# Patient Record
Sex: MALE | Race: WHITE | NOT HISPANIC OR LATINO | Employment: STUDENT | URBAN - METROPOLITAN AREA
[De-identification: names, ages, dates, MRNs, and addresses within clinical notes are randomized per-mention and may not be internally consistent; named-entity substitution may affect disease eponyms.]

---

## 2017-08-17 ENCOUNTER — OFFICE VISIT (OUTPATIENT)
Dept: FAMILY MEDICINE CLINIC | Facility: CLINIC | Age: 27
End: 2017-08-17

## 2017-08-17 VITALS
TEMPERATURE: 97.8 F | HEART RATE: 70 BPM | DIASTOLIC BLOOD PRESSURE: 70 MMHG | WEIGHT: 211 LBS | SYSTOLIC BLOOD PRESSURE: 122 MMHG | BODY MASS INDEX: 27.96 KG/M2 | HEIGHT: 73 IN | OXYGEN SATURATION: 98 %

## 2017-08-17 DIAGNOSIS — Z23 NEED FOR VACCINATION AGAINST HEPATITIS B VIRUS: Primary | ICD-10-CM

## 2017-08-17 DIAGNOSIS — Z71.85 IMMUNIZATION COUNSELING: ICD-10-CM

## 2017-08-17 PROCEDURE — 90471 IMMUNIZATION ADMIN: CPT | Performed by: NURSE PRACTITIONER

## 2017-08-17 PROCEDURE — 90746 HEPB VACCINE 3 DOSE ADULT IM: CPT | Performed by: NURSE PRACTITIONER

## 2017-08-17 PROCEDURE — 99212 OFFICE O/P EST SF 10 MIN: CPT | Performed by: NURSE PRACTITIONER

## 2017-08-17 NOTE — PROGRESS NOTES
Subjective   Yaneth Harrell is a 26 y.o. male.   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 Denies fever, recent illness or previous adverse reaction to vaccine.    History of Present Illness  The patient has no complaints or health concerns today.  The following portions of the patient's history were reviewed and updated as appropriate: allergies, current medications, past family history, past medical history, past social history, past surgical history and problem list.    Review of Systems   Constitutional: Negative for activity change, appetite change, chills, diaphoresis, fatigue and fever.   HENT: Negative.    Respiratory: Negative.        Objective   Physical Exam   Constitutional: He is oriented to person, place, and time. Vital signs are normal. He appears well-developed and well-nourished. He is cooperative. No distress.   Cardiovascular: Normal rate, regular rhythm and normal heart sounds.    Pulmonary/Chest: Effort normal and breath sounds normal.   Neurological: He is alert and oriented to person, place, and time.   Skin: Skin is warm and dry. No rash noted. He is not diaphoretic.   Psychiatric: He has a normal mood and affect. His behavior is normal. Judgment and thought content normal.   Vitals reviewed.      Assessment/Plan   Yaneth was seen today for immunizations.    Diagnoses and all orders for this visit:    Need for vaccination against hepatitis B virus  -     Hepatitis B Vaccine Adult IM    Immunization counseling  -     Hepatitis B Vaccine Adult IM       Hepatitis B vaccination discussed with patient and questions answered. Patient advised that vaccination is a series of three injections at 0, 1 and 6 months. VIS information provided to patient as well as copy of immunization record.

## 2017-08-17 NOTE — PATIENT INSTRUCTIONS
Hepatitis B Vaccine: What You Need to Know  1. Why get vaccinated?  Hepatitis B is a serious disease that affects the liver. It is caused by the hepatitis B virus. Hepatitis B can cause mild illness lasting a few weeks, or it can lead to a serious, lifelong illness.  Hepatitis B virus infection can be either acute or chronic.  Acute hepatitis B virus infection is a short-term illness that occurs within the first 6 months after someone is exposed to the hepatitis B virus. This can lead to:  · fever, fatigue, loss of appetite, nausea, and/or vomiting  · jaundice (yellow skin or eyes, dark urine, jordin-colored bowel movements)  · pain in muscles, joints, and stomach  Chronic hepatitis B virus infection is a long-term illness that occurs when the hepatitis B virus remains in a person's body. Most people who go on to develop chronic hepatitis B do not have symptoms, but it is still very serious and can lead to:  · liver damage (cirrhosis)  · liver cancer  · death  Chronically-infected people can spread hepatitis B virus to others, even if they do not feel or look sick themselves. Up to 1.4 million people in the [place] may have chronic hepatitis B infection. About 90% of infants who get hepatitis B become chronically infected and about 1 out of 4 of them dies.  Hepatitis B is spread when blood, semen, or other body fluid infected with the Hepatitis B virus enters the body of a person who is not infected. People can become infected with the virus through:  · Birth (a baby whose mother is infected can be infected at or after birth)  · Sharing items such as razors or toothbrushes with an infected person  · Contact with the blood or open sores of an infected person  · Sex with an infected partner  · Sharing needles, syringes, or other drug-injection equipment  · Exposure to blood from needlesticks or other sharp instruments  Each year about 2,000 people in the [place] die from hepatitis B-related liver  disease.  Hepatitis B vaccine can prevent hepatitis B and its consequences, including liver cancer and cirrhosis.  2. Hepatitis B vaccine  Hepatitis B vaccine is made from parts of the hepatitis B virus. It cannot cause hepatitis B infection. The vaccine is usually given as 3 or 4 shots over a 6-month period.  Infants should get their first dose of hepatitis B vaccine at birth and will usually complete the series at 6 months of age.  All children and adolescents younger than 19 years of age who have not yet gotten the vaccine should also be vaccinated.  Hepatitis B vaccine is recommended for unvaccinated adults who are at risk for hepatitis B virus infection, including:  · People whose sex partners have hepatitis B  · Sexually active persons who are not in a long-term monogamous relationship  · Persons seeking evaluation or treatment for a sexually transmitted disease  · Men who have sexual contact with other men  · People who share needles, syringes, or other drug-injection equipment  · People who have household contact with someone infected with the hepatitis B virus  · Health care and public safety workers at risk for exposure to blood or body fluids  · Residents and staff of facilities for developmentally disabled persons  · Persons in correctional facilities  · Victims of sexual assault or abuse  · Travelers to regions with increased rates of hepatitis B  · People with chronic liver disease, kidney disease, HIV infection, or diabetes  · Anyone who wants to be protected from hepatitis B  There are no known risks to getting hepatitis B vaccine at the same time as other vaccines.  3. Some people should not get this vaccine  Tell the person who is giving the vaccine:  · If the person getting the vaccine has any severe, life-threatening allergies.  If you ever had a life-threatening allergic reaction after a dose of hepatitis B vaccine, or have a severe allergy to any part of this vaccine, you may be advised not to  get vaccinated. Ask your health care provider if you want information about vaccine components.  · If the person getting the vaccine is not feeling well.  If you have a mild illness, such as a cold, you can probably get the vaccine today. If you are moderately or severely ill, you should probably wait until you recover. Your doctor can advise you.  4. Risks of a vaccine reaction  With any medicine, including vaccines, there is a chance of side effects. These are usually mild and go away on their own, but serious reactions are also possible.  Most people who get hepatitis B vaccine do not have any problems with it.  Minor problems following hepatitis B vaccine include:  · soreness where the shot was given  · temperature of 99.9°F or higher  If these problems occur, they usually begin soon after the shot and last 1 or 2 days.  Your doctor can tell you more about these reactions.  Other problems that could happen after this vaccine:  · People sometimes faint after a medical procedure, including vaccination. Sitting or lying down for about 15 minutes can help prevent fainting and injuries caused by a fall. Tell your provider if you feel dizzy, or have vision changes or ringing in the ears.  · Some people get shoulder pain that can be more severe and longer-lasting than the more routine soreness that can follow injections. This happens very rarely.  · Any medication can cause a severe allergic reaction. Such reactions from a vaccine are very rare, estimated at about 1 in a million doses, and would happen within a few minutes to a few hours after the vaccination.  As with any medicine, there is a very remote chance of a vaccine causing a serious injury or death.  The safety of vaccines is always being monitored. For more information, visit: www.cdc.gov/vaccinesafety/  5. What if there is a serious problem?  What should I look for?  · Look for anything that concerns you, such as signs of a severe allergic reaction, very  high fever, or unusual behavior.  Signs of a severe allergic reaction can include hives, swelling of the face and throat, difficulty breathing, a fast heartbeat, dizziness, and weakness. These would start a few minutes to a few hours after the vaccination.  What should I do?  · If you think it is a severe allergic reaction or other emergency that can't wait, call 9-1-1 or get to the nearest hospital. Otherwise, call your clinic.  Afterward, the reaction should be reported to the Vaccine Adverse Event Reporting System (VAERS). Your doctor should file this report, or you can do it yourself through the VAERS web site at www.vaers.[facility].gov, or by calling 1-199.809.1410.  VAERS does not give medical advice.  6. The National Vaccine Injury Compensation Program  The National Vaccine Injury Compensation Program (VICP) is a federal program that was created to compensate people who may have been injured by certain vaccines.  Persons who believe they may have been injured by a vaccine can learn about the program and about filing a claim by calling 1-616.158.5574 or visiting the VICP website at www.hrsa.gov/vaccinecompensation. There is a time limit to file a claim for compensation.  7. How can I learn more?  · Ask your healthcare provider. He or she can give you the vaccine package insert or suggest other sources of information.  · Call your local or state health department.  · Contact the Centers for Disease Control and Prevention (CDC):    Call 1-848.570.3759 (0-144-DPY-INFO) or    Visit CDC's website at www.cdc.gov/vaccines  CDC Hepatitis B VIS (7/20/2016)     This information is not intended to replace advice given to you by your health care provider. Make sure you discuss any questions you have with your health care provider.     Document Released: 10/12/2007 Document Revised: 09/07/2016 Document Reviewed: 08/06/2016  Elsevier Interactive Patient Education ©2017 [employer]  Immunization Schedule, Adult  · Influenza  vaccine.    All adults should be immunized every year.    All adults, including pregnant women and people with hives-only allergy to eggs can receive the inactivated influenza (IIV) vaccine.    Adults aged 18-49 years can receive the recombinant influenza (JASMIN) vaccine. The JASMIN vaccine does not contain any egg protein.    Adults aged 65 years or older can receive the standard-dose IIV or the high-dose IIV.  · Tetanus, diphtheria, and acellular pertussis (Td, Tdap) vaccine.    Pregnant women should receive 1 dose of Tdap vaccine during each pregnancy. The dose should be obtained regardless of the length of time since the last dose. Immunization is preferred during the 27th to 36th week of gestation.    An adult who has not previously received Tdap or who does not know his or her vaccine status should receive 1 dose of Tdap. This initial dose should be followed by tetanus and diphtheria toxoids (Td) booster doses every 10 years.    Adults with an unknown or incomplete history of completing a 3-dose immunization series with Td-containing vaccines should begin or complete a primary immunization series including a Tdap dose.    Adults should receive a Td booster every 10 years.  · Varicella vaccine.    An adult without evidence of immunity to varicella should receive 2 doses or a second dose if he or she has previously received 1 dose.    Pregnant females who do not have evidence of immunity should receive the first dose after pregnancy. This first dose should be obtained before leaving the health care facility. The second dose should be obtained 4-8 weeks after the first dose.  · Human papillomavirus (HPV) vaccine.    Females aged 13-26 years who have not received the vaccine previously should obtain the 3-dose series.    The vaccine is not recommended for use in pregnant females. However, pregnancy testing is not needed before receiving a dose. If a female is found to be pregnant after receiving a dose, no treatment is  needed. In that case, the remaining doses should be delayed until after the pregnancy.    Males aged 13-21 years who have not received the vaccine previously should receive the 3-dose series. Males aged 22-26 years may be immunized.    Immunization is recommended through the age of 26 years for any male who has sex with males and did not get any or all doses earlier.    Immunization is recommended for any person with an immunocompromised condition through the age of 26 years if he or she did not get any or all doses earlier.    During the 3-dose series, the second dose should be obtained 4-8 weeks after the first dose. The third dose should be obtained 24 weeks after the first dose and 16 weeks after the second dose.  · Zoster vaccine.    One dose is recommended for adults aged 60 years or older unless certain conditions are present.  · Measles, mumps, and rubella (MMR) vaccine.    Adults born before 1957 generally are considered immune to measles and mumps.    Adults born in 1957 or later should have 1 or more doses of MMR vaccine unless there is a contraindication to the vaccine or there is laboratory evidence of immunity to each of the three diseases.    A routine second dose of MMR vaccine should be obtained at least 28 days after the first dose for students attending postsecondary schools, health care workers, or international travelers.    People who received inactivated measles vaccine or an unknown type of measles vaccine during [id number] should receive 2 doses of MMR vaccine.    People who received inactivated mumps vaccine or an unknown type of mumps vaccine before 1979 and are at high risk for mumps infection should consider immunization with 2 doses of MMR vaccine.    For females of childbearing age, rubella immunity should be determined. If there is no evidence of immunity, females who are not pregnant should be vaccinated. If there is no evidence of immunity, females who are pregnant should delay  immunization until after pregnancy.    Unvaccinated health care workers born before 1957 who lack laboratory evidence of measles, mumps, or rubella immunity or laboratory confirmation of disease should consider measles and mumps immunization with 2 doses of MMR vaccine or rubella immunization with 1 dose of MMR vaccine.  · Pneumococcal 13-valent conjugate (PCV13) vaccine.    When indicated, a person who is uncertain of his or her immunization history and has no record of immunization should receive the PCV13 vaccine.    An adult aged 19 years or older who has certain medical conditions and has not been previously immunized should receive 1 dose of PCV13 vaccine. This PCV13 should be followed with a dose of pneumococcal polysaccharide (PPSV23) vaccine. The PPSV23 vaccine dose should be obtained at least 8 weeks after the dose of PCV13 vaccine.    An adult aged 19 years or older who has certain medical conditions and previously received 1 or more doses of PPSV23 vaccine should receive 1 dose of PCV13. The PCV13 vaccine dose should be obtained 1 or more years after the last PPSV23 vaccine dose.  · Pneumococcal polysaccharide (PPSV23) vaccine.    When PCV13 is also indicated, PCV13 should be obtained first.    All adults aged 65 years and older should be immunized.    An adult younger than age 65 years who has certain medical conditions should be immunized.    Any person who resides in a nursing home or long-term care facility should be immunized.    An adult smoker should be immunized.    People with an immunocompromised condition and certain other conditions should receive both PCV13 and PPSV23 vaccines.    People with human immunodeficiency virus (HIV) infection should be immunized as soon as possible after diagnosis.    Immunization during chemotherapy or radiation therapy should be avoided.    Routine use of PPSV23 vaccine is not recommended for American Indians, [place] Natives, or people younger than 65 years  unless there are medical conditions that require PPSV23 vaccine.    When indicated, people who have unknown immunization and have no record of immunization should receive PPSV23 vaccine.    One-time revaccination 5 years after the first dose of PPSV23 is recommended for people aged 19-64 years who have chronic kidney failure, nephrotic syndrome, asplenia, or immunocompromised conditions.    People who received 1-2 doses of PPSV23 before age 65 years should receive another dose of PPSV23 vaccine at age 65 years or later if at least 5 years have passed since the previous dose.    Doses of PPSV23 are not needed for people immunized with PPSV23 at or after age 65 years.  · Meningococcal vaccine.    Adults with asplenia or persistent complement component deficiencies should receive 2 doses of quadrivalent meningococcal conjugate (MenACWY-D) vaccine. The doses should be obtained at least 2 months apart.    Microbiologists working with certain meningococcal bacteria,  recruits, people at risk during an outbreak, and people who travel to or live in countries with a high rate of meningitis should be immunized.    A first-year college student up through age 21 years who is living in a residence [place] should receive a dose if he or she did not receive a dose on or after his or her 16th birthday.    Adults who have certain high-risk conditions should receive one or more doses of vaccine.  · Hepatitis A vaccine.    Adults who wish to be protected from this disease, have certain high-risk conditions, work with hepatitis A-infected animals, work in hepatitis A research labs, or travel to or work in countries with a high rate of hepatitis A should be immunized.    Adults who were previously unvaccinated and who anticipate close contact with an international adoptee during the first 60 days after arrival in the [place] from a country with a high rate of hepatitis A should be immunized.  · Hepatitis B vaccine.    Adults  who wish to be protected from this disease, have certain high-risk conditions, may be exposed to blood or other infectious body fluids, are household contacts or sex partners of hepatitis B positive people, are clients or workers in certain care facilities, or travel to or work in countries with a high rate of hepatitis B should be immunized.  · Haemophilus influenzae type b (Hib) vaccine.    A previously unvaccinated person with asplenia or sickle cell disease or having a scheduled splenectomy should receive 1 dose of Hib vaccine.    Regardless of previous immunization, a recipient of a hematopoietic stem cell transplant should receive a 3-dose series 6-12 months after his or her successful transplant.    Hib vaccine is not recommended for adults with HIV infection.     This information is not intended to replace advice given to you by your health care provider. Make sure you discuss any questions you have with your health care provider.     Document Released: 03/09/2005 Document Revised: 04/14/2014 Document Reviewed: 02/04/2014  [employer] Interactive Patient Education ©2017 [employer] Inc.

## 2017-09-18 ENCOUNTER — CLINICAL SUPPORT (OUTPATIENT)
Dept: FAMILY MEDICINE CLINIC | Facility: CLINIC | Age: 27
End: 2017-09-18

## 2017-09-18 DIAGNOSIS — Z23 NEED FOR VACCINATION: Primary | ICD-10-CM

## 2017-09-18 PROCEDURE — 90471 IMMUNIZATION ADMIN: CPT | Performed by: NURSE PRACTITIONER

## 2017-09-18 PROCEDURE — 90746 HEPB VACCINE 3 DOSE ADULT IM: CPT | Performed by: NURSE PRACTITIONER

## 2017-12-05 ENCOUNTER — OFFICE VISIT (OUTPATIENT)
Dept: FAMILY MEDICINE CLINIC | Facility: CLINIC | Age: 27
End: 2017-12-05

## 2017-12-05 VITALS
HEART RATE: 72 BPM | HEIGHT: 73 IN | BODY MASS INDEX: 27.22 KG/M2 | SYSTOLIC BLOOD PRESSURE: 118 MMHG | WEIGHT: 205.4 LBS | DIASTOLIC BLOOD PRESSURE: 74 MMHG | OXYGEN SATURATION: 98 % | TEMPERATURE: 99 F

## 2017-12-05 DIAGNOSIS — Z00.00 HEALTHCARE MAINTENANCE: Primary | ICD-10-CM

## 2017-12-05 LAB
ALBUMIN SERPL-MCNC: 5 G/DL (ref 3.2–4.8)
ALBUMIN/GLOB SERPL: 1.4 G/DL (ref 1.5–2.5)
ALP SERPL-CCNC: 104 U/L (ref 25–100)
ALT SERPL W P-5'-P-CCNC: 54 U/L (ref 7–40)
ANION GAP SERPL CALCULATED.3IONS-SCNC: 11 MMOL/L (ref 3–11)
ARTICHOKE IGE QN: 181 MG/DL (ref 0–130)
AST SERPL-CCNC: 24 U/L (ref 0–33)
BASOPHILS # BLD AUTO: 0.02 10*3/MM3 (ref 0–0.2)
BASOPHILS NFR BLD AUTO: 0.4 % (ref 0–1)
BILIRUB SERPL-MCNC: 0.6 MG/DL (ref 0.3–1.2)
BUN BLD-MCNC: 18 MG/DL (ref 9–23)
BUN/CREAT SERPL: 16.4 (ref 7–25)
CALCIUM SPEC-SCNC: 10 MG/DL (ref 8.7–10.4)
CHLORIDE SERPL-SCNC: 99 MMOL/L (ref 99–109)
CHOLEST SERPL-MCNC: 224 MG/DL (ref 0–200)
CO2 SERPL-SCNC: 29 MMOL/L (ref 20–31)
CREAT BLD-MCNC: 1.1 MG/DL (ref 0.6–1.3)
CRP SERPL-MCNC: 0.06 MG/DL (ref 0–1)
DEPRECATED RDW RBC AUTO: 38.1 FL (ref 37–54)
EOSINOPHIL # BLD AUTO: 0.07 10*3/MM3 (ref 0–0.3)
EOSINOPHIL NFR BLD AUTO: 1.5 % (ref 0–3)
ERYTHROCYTE [DISTWIDTH] IN BLOOD BY AUTOMATED COUNT: 12.3 % (ref 11.3–14.5)
GFR SERPL CREATININE-BSD FRML MDRD: 82 ML/MIN/1.73
GLOBULIN UR ELPH-MCNC: 3.6 GM/DL
GLUCOSE BLD-MCNC: 77 MG/DL (ref 70–100)
HCT VFR BLD AUTO: 44.3 % (ref 38.9–50.9)
HDLC SERPL-MCNC: 45 MG/DL (ref 40–60)
HGB BLD-MCNC: 14.8 G/DL (ref 13.1–17.5)
HIV1+2 AB SER QL: NORMAL
IMM GRANULOCYTES # BLD: 0.01 10*3/MM3 (ref 0–0.03)
IMM GRANULOCYTES NFR BLD: 0.2 % (ref 0–0.6)
LYMPHOCYTES # BLD AUTO: 1.38 10*3/MM3 (ref 0.6–4.8)
LYMPHOCYTES NFR BLD AUTO: 30.4 % (ref 24–44)
MCH RBC QN AUTO: 28.5 PG (ref 27–31)
MCHC RBC AUTO-ENTMCNC: 33.4 G/DL (ref 32–36)
MCV RBC AUTO: 85.2 FL (ref 80–99)
MONOCYTES # BLD AUTO: 0.52 10*3/MM3 (ref 0–1)
MONOCYTES NFR BLD AUTO: 11.5 % (ref 0–12)
NEUTROPHILS # BLD AUTO: 2.54 10*3/MM3 (ref 1.5–8.3)
NEUTROPHILS NFR BLD AUTO: 56 % (ref 41–71)
PLATELET # BLD AUTO: 324 10*3/MM3 (ref 150–450)
PMV BLD AUTO: 9.8 FL (ref 6–12)
POTASSIUM BLD-SCNC: 4.5 MMOL/L (ref 3.5–5.5)
PROT SERPL-MCNC: 8.6 G/DL (ref 5.7–8.2)
RBC # BLD AUTO: 5.2 10*6/MM3 (ref 4.2–5.76)
SODIUM BLD-SCNC: 139 MMOL/L (ref 132–146)
TRIGL SERPL-MCNC: 113 MG/DL (ref 0–150)
TSH SERPL DL<=0.05 MIU/L-ACNC: 0.93 MIU/ML (ref 0.35–5.35)
URATE SERPL-MCNC: 7.5 MG/DL (ref 3.7–9.2)
WBC NRBC COR # BLD: 4.54 10*3/MM3 (ref 3.5–10.8)

## 2017-12-05 PROCEDURE — 99395 PREV VISIT EST AGE 18-39: CPT | Performed by: FAMILY MEDICINE

## 2017-12-05 PROCEDURE — 90715 TDAP VACCINE 7 YRS/> IM: CPT | Performed by: FAMILY MEDICINE

## 2017-12-05 PROCEDURE — 80061 LIPID PANEL: CPT | Performed by: FAMILY MEDICINE

## 2017-12-05 PROCEDURE — 86140 C-REACTIVE PROTEIN: CPT | Performed by: FAMILY MEDICINE

## 2017-12-05 PROCEDURE — 90471 IMMUNIZATION ADMIN: CPT | Performed by: FAMILY MEDICINE

## 2017-12-05 PROCEDURE — 80053 COMPREHEN METABOLIC PANEL: CPT | Performed by: FAMILY MEDICINE

## 2017-12-05 PROCEDURE — 85025 COMPLETE CBC W/AUTO DIFF WBC: CPT | Performed by: FAMILY MEDICINE

## 2017-12-05 PROCEDURE — 84443 ASSAY THYROID STIM HORMONE: CPT | Performed by: FAMILY MEDICINE

## 2017-12-05 PROCEDURE — G0432 EIA HIV-1/HIV-2 SCREEN: HCPCS | Performed by: FAMILY MEDICINE

## 2017-12-05 PROCEDURE — 84550 ASSAY OF BLOOD/URIC ACID: CPT | Performed by: FAMILY MEDICINE

## 2017-12-05 PROCEDURE — 36415 COLL VENOUS BLD VENIPUNCTURE: CPT | Performed by: FAMILY MEDICINE

## 2017-12-05 PROCEDURE — 86580 TB INTRADERMAL TEST: CPT | Performed by: FAMILY MEDICINE

## 2017-12-05 RX ORDER — LORATADINE 10 MG/1
10 TABLET ORAL DAILY
COMMUNITY

## 2017-12-05 NOTE — PROGRESS NOTES
Subjective   Yaneth Harrell is a 27 y.o. male.     History of Present Illness   He is here for his annual wellness evaluation.  He is not fasting.  He has already had the flu shot but needs a Tdap update.  He is also requesting varicella titers and PPD placement for school.  He voices no acute symptoms.    Review of Systems   Constitutional: Negative.    HENT: Negative.    Eyes: Negative.    Respiratory: Negative.    Cardiovascular: Negative.    Gastrointestinal: Negative.    Endocrine: Negative.    Genitourinary: Negative.    Musculoskeletal: Negative.    Skin: Negative.    Allergic/Immunologic: Negative.    Neurological: Negative.    Hematological: Negative.    Psychiatric/Behavioral: Negative.        Objective   Physical Exam   Constitutional: He is oriented to person, place, and time. He appears well-developed and well-nourished. He is cooperative.   HENT:   Head: Normocephalic and atraumatic.   Right Ear: Hearing and external ear normal.   Left Ear: Hearing and external ear normal.   Nose: Nose normal.   Mouth/Throat: Uvula is midline, oropharynx is clear and moist and mucous membranes are normal.   Eyes: Conjunctivae and EOM are normal. Pupils are equal, round, and reactive to light. No scleral icterus.   Neck: Trachea normal and normal range of motion. Neck supple. No JVD present. Carotid bruit is not present. No thyromegaly present.   Cardiovascular: Normal rate, regular rhythm, normal heart sounds and intact distal pulses.    Pulmonary/Chest: Effort normal and breath sounds normal.   Abdominal: Soft. Bowel sounds are normal. There is no hepatosplenomegaly. There is no tenderness.   Musculoskeletal: Normal range of motion.   Lymphadenopathy:     He has no cervical adenopathy.   Neurological: He is alert and oriented to person, place, and time. He has normal strength and normal reflexes. No sensory deficit. Gait normal.   Skin: Skin is warm and dry.   Psychiatric: He has a normal mood and affect. His speech  is normal and behavior is normal. Judgment and thought content normal. Cognition and memory are normal.   Nursing note and vitals reviewed.      Assessment/Plan   Diagnoses and all orders for this visit:    Healthcare maintenance  -     POC Urinalysis Dipstick, Automated  -     Comprehensive Metabolic Panel  -     CBC & Differential  -     Lipid Panel  -     TSH  -     Uric Acid  -     C-reactive Protein  -     HIV-1 / O / 2 Ag / Antibody 4th Generation  -     TB Skin Test  -     Varicella Zoster Antibodies, IgG / IgM  -     Tdap Vaccine Greater Than or Equal To 6yo IM  - Vaccine counseling and current VIS is provided for immunizations administered today.    The patient is here for a health maintenance visit.  Currently, the patient consumes a healthy diet and has an adequate exercise regimen. Screening lab work is ordered.  Immunizations are updated today.  Advice and education is given regarding nutrition, aerobic exercise, routine dental evaluations, routine eye exams, reproductive health, cardiovascular risk reduction, sunscreen use, self skin examination (annual dermatology evaluations) and seat belt use (general overall safety).  Further recommendations after lab evaluation.  Annual wellness evaluations recommended.

## 2017-12-07 LAB
INDURATION: 0 MM (ref 0–10)
TB SKIN TEST: NEGATIVE

## 2017-12-08 LAB
VZV IGG SER IA-ACNC: >4000 INDEX
VZV IGM SER IA-ACNC: <0.91 INDEX (ref 0–0.9)

## 2017-12-12 ENCOUNTER — TELEPHONE (OUTPATIENT)
Dept: FAMILY MEDICINE CLINIC | Facility: CLINIC | Age: 27
End: 2017-12-12

## 2017-12-12 ENCOUNTER — CLINICAL SUPPORT (OUTPATIENT)
Dept: FAMILY MEDICINE CLINIC | Facility: CLINIC | Age: 27
End: 2017-12-12

## 2017-12-12 DIAGNOSIS — Z11.1 VISIT FOR TB SKIN TEST: Primary | ICD-10-CM

## 2017-12-12 PROCEDURE — 86580 TB INTRADERMAL TEST: CPT | Performed by: FAMILY MEDICINE

## 2017-12-12 NOTE — TELEPHONE ENCOUNTER
----- Message from Josseline Morrison sent at 12/12/2017 11:51 AM EST -----  Contact: [phone]  LAB RESULTS

## 2017-12-14 LAB
INDURATION: 0 MM (ref 0–10)
TB SKIN TEST: NEGATIVE

## 2018-12-12 ENCOUNTER — CLINICAL SUPPORT (OUTPATIENT)
Dept: FAMILY MEDICINE CLINIC | Facility: CLINIC | Age: 28
End: 2018-12-12

## 2018-12-12 DIAGNOSIS — Z11.1 VISIT FOR TB SKIN TEST: Primary | ICD-10-CM

## 2018-12-12 PROCEDURE — 86580 TB INTRADERMAL TEST: CPT | Performed by: NURSE PRACTITIONER

## 2018-12-14 LAB
INDURATION: 0 MM (ref 0–10)
TB SKIN TEST: NEGATIVE